# Patient Record
Sex: FEMALE
[De-identification: names, ages, dates, MRNs, and addresses within clinical notes are randomized per-mention and may not be internally consistent; named-entity substitution may affect disease eponyms.]

---

## 2017-01-04 ENCOUNTER — HOSPITAL ENCOUNTER (OUTPATIENT)
Dept: HOSPITAL 5 - WOUND | Age: 82
Discharge: HOME | End: 2017-01-04
Attending: ORTHOPAEDIC SURGERY
Payer: MEDICARE

## 2017-01-04 DIAGNOSIS — I10: ICD-10-CM

## 2017-01-04 DIAGNOSIS — B95.62: ICD-10-CM

## 2017-01-04 DIAGNOSIS — J43.9: ICD-10-CM

## 2017-01-04 DIAGNOSIS — I25.2: ICD-10-CM

## 2017-01-04 DIAGNOSIS — L97.821: ICD-10-CM

## 2017-01-04 DIAGNOSIS — I70.248: Primary | ICD-10-CM

## 2017-01-18 ENCOUNTER — HOSPITAL ENCOUNTER (OUTPATIENT)
Dept: HOSPITAL 5 - WOUND | Age: 82
Discharge: HOME | End: 2017-01-18
Attending: ORTHOPAEDIC SURGERY
Payer: MEDICARE

## 2017-01-18 DIAGNOSIS — I25.2: ICD-10-CM

## 2017-01-18 DIAGNOSIS — I10: ICD-10-CM

## 2017-01-18 DIAGNOSIS — B35.1: ICD-10-CM

## 2017-01-18 DIAGNOSIS — L97.821: ICD-10-CM

## 2017-01-18 DIAGNOSIS — J43.9: ICD-10-CM

## 2017-01-18 DIAGNOSIS — I70.248: Primary | ICD-10-CM

## 2017-01-18 DIAGNOSIS — I73.9: ICD-10-CM

## 2017-02-22 ENCOUNTER — HOSPITAL ENCOUNTER (OUTPATIENT)
Dept: HOSPITAL 5 - WOUND | Age: 82
Discharge: HOME | End: 2017-02-22
Attending: INTERNAL MEDICINE
Payer: MEDICARE

## 2017-02-22 DIAGNOSIS — L89.622: ICD-10-CM

## 2017-02-22 DIAGNOSIS — I20.8: ICD-10-CM

## 2017-02-22 DIAGNOSIS — I10: ICD-10-CM

## 2017-02-22 DIAGNOSIS — L97.822: ICD-10-CM

## 2017-02-22 DIAGNOSIS — I70.248: Primary | ICD-10-CM

## 2017-02-22 DIAGNOSIS — I73.9: ICD-10-CM

## 2017-02-22 DIAGNOSIS — I25.2: ICD-10-CM

## 2017-02-22 DIAGNOSIS — J43.9: ICD-10-CM

## 2017-02-22 DIAGNOSIS — B35.1: ICD-10-CM

## 2017-02-22 DIAGNOSIS — K21.0: ICD-10-CM

## 2017-02-22 DIAGNOSIS — G62.9: ICD-10-CM

## 2017-03-01 ENCOUNTER — HOSPITAL ENCOUNTER (OUTPATIENT)
Dept: HOSPITAL 5 - WOUND | Age: 82
Discharge: HOME | End: 2017-03-01
Attending: INTERNAL MEDICINE
Payer: MEDICARE

## 2017-03-01 DIAGNOSIS — I70.238: ICD-10-CM

## 2017-03-01 DIAGNOSIS — L89.622: ICD-10-CM

## 2017-03-01 DIAGNOSIS — J43.9: ICD-10-CM

## 2017-03-01 DIAGNOSIS — L97.822: ICD-10-CM

## 2017-03-01 DIAGNOSIS — B35.1: ICD-10-CM

## 2017-03-01 DIAGNOSIS — G62.9: ICD-10-CM

## 2017-03-01 DIAGNOSIS — I25.2: ICD-10-CM

## 2017-03-01 DIAGNOSIS — I70.248: ICD-10-CM

## 2017-03-01 DIAGNOSIS — I87.312: Primary | ICD-10-CM

## 2017-03-01 DIAGNOSIS — K21.0: ICD-10-CM

## 2017-03-01 DIAGNOSIS — I73.9: ICD-10-CM

## 2017-03-01 DIAGNOSIS — L97.812: ICD-10-CM

## 2017-03-01 PROCEDURE — 11055 PARING/CUTG B9 HYPRKER LES 1: CPT

## 2017-03-15 ENCOUNTER — HOSPITAL ENCOUNTER (OUTPATIENT)
Dept: HOSPITAL 5 - WOUND | Age: 82
Discharge: HOME | End: 2017-03-15
Attending: INTERNAL MEDICINE
Payer: MEDICARE

## 2017-03-15 DIAGNOSIS — I70.203: ICD-10-CM

## 2017-03-15 DIAGNOSIS — K21.0: ICD-10-CM

## 2017-03-15 DIAGNOSIS — I20.8: ICD-10-CM

## 2017-03-15 DIAGNOSIS — I87.312: Primary | ICD-10-CM

## 2017-03-15 DIAGNOSIS — I73.9: ICD-10-CM

## 2017-03-15 DIAGNOSIS — I25.2: ICD-10-CM

## 2017-03-15 DIAGNOSIS — J43.9: ICD-10-CM

## 2017-03-15 DIAGNOSIS — L97.822: ICD-10-CM

## 2017-03-15 DIAGNOSIS — B35.1: ICD-10-CM

## 2017-03-15 DIAGNOSIS — G62.9: ICD-10-CM

## 2017-03-29 ENCOUNTER — HOSPITAL ENCOUNTER (OUTPATIENT)
Dept: HOSPITAL 5 - WOUND | Age: 82
Discharge: HOME | End: 2017-03-29
Attending: PODIATRIST
Payer: MEDICARE

## 2017-03-29 DIAGNOSIS — I20.8: ICD-10-CM

## 2017-03-29 DIAGNOSIS — K21.0: ICD-10-CM

## 2017-03-29 DIAGNOSIS — I10: ICD-10-CM

## 2017-03-29 DIAGNOSIS — J43.9: ICD-10-CM

## 2017-03-29 DIAGNOSIS — I73.9: ICD-10-CM

## 2017-03-29 DIAGNOSIS — I70.203: ICD-10-CM

## 2017-03-29 DIAGNOSIS — L97.821: ICD-10-CM

## 2017-03-29 DIAGNOSIS — I87.312: Primary | ICD-10-CM

## 2017-03-29 DIAGNOSIS — L89.622: ICD-10-CM

## 2017-03-29 DIAGNOSIS — I25.2: ICD-10-CM

## 2017-03-29 PROCEDURE — 11042 DBRDMT SUBQ TIS 1ST 20SQCM/<: CPT

## 2017-04-12 ENCOUNTER — HOSPITAL ENCOUNTER (OUTPATIENT)
Dept: HOSPITAL 5 - WOUND | Age: 82
Discharge: HOME | End: 2017-04-12
Attending: INTERNAL MEDICINE
Payer: MEDICARE

## 2017-04-12 DIAGNOSIS — I73.9: ICD-10-CM

## 2017-04-12 DIAGNOSIS — G62.9: ICD-10-CM

## 2017-04-12 DIAGNOSIS — I87.312: Primary | ICD-10-CM

## 2017-04-12 DIAGNOSIS — I28.0: ICD-10-CM

## 2017-04-12 DIAGNOSIS — K21.0: ICD-10-CM

## 2017-04-12 DIAGNOSIS — J43.9: ICD-10-CM

## 2017-04-12 DIAGNOSIS — I70.203: ICD-10-CM

## 2017-04-12 DIAGNOSIS — L97.822: ICD-10-CM

## 2017-04-12 DIAGNOSIS — I25.2: ICD-10-CM

## 2017-04-12 PROCEDURE — C5271 LOW COST SKIN SUBSTITUTE APP: HCPCS

## 2017-04-19 ENCOUNTER — HOSPITAL ENCOUNTER (OUTPATIENT)
Dept: HOSPITAL 5 - WOUND | Age: 82
Discharge: HOME | End: 2017-04-19
Attending: INTERNAL MEDICINE
Payer: MEDICARE

## 2017-04-19 DIAGNOSIS — I87.312: Primary | ICD-10-CM

## 2017-04-19 DIAGNOSIS — L89.622: ICD-10-CM

## 2017-04-19 DIAGNOSIS — I73.9: ICD-10-CM

## 2017-04-19 DIAGNOSIS — L97.821: ICD-10-CM

## 2017-04-19 DIAGNOSIS — K21.0: ICD-10-CM

## 2017-04-19 DIAGNOSIS — I70.203: ICD-10-CM

## 2017-04-19 DIAGNOSIS — J43.9: ICD-10-CM

## 2017-04-19 DIAGNOSIS — I20.8: ICD-10-CM

## 2017-04-19 DIAGNOSIS — I25.2: ICD-10-CM

## 2017-04-26 ENCOUNTER — HOSPITAL ENCOUNTER (OUTPATIENT)
Dept: HOSPITAL 5 - WOUND | Age: 82
Discharge: HOME | End: 2017-04-26
Attending: SURGERY
Payer: MEDICARE

## 2017-04-26 DIAGNOSIS — I70.203: ICD-10-CM

## 2017-04-26 DIAGNOSIS — I87.312: ICD-10-CM

## 2017-04-26 DIAGNOSIS — K21.9: ICD-10-CM

## 2017-04-26 DIAGNOSIS — I73.9: ICD-10-CM

## 2017-04-26 DIAGNOSIS — G62.9: ICD-10-CM

## 2017-04-26 DIAGNOSIS — I87.2: Primary | ICD-10-CM

## 2017-04-26 DIAGNOSIS — I25.2: ICD-10-CM

## 2017-04-26 DIAGNOSIS — L89.622: ICD-10-CM

## 2017-04-26 DIAGNOSIS — L97.822: ICD-10-CM

## 2017-04-26 DIAGNOSIS — J43.9: ICD-10-CM

## 2017-04-26 DIAGNOSIS — L89.892: ICD-10-CM

## 2017-04-26 PROCEDURE — 97597 DBRDMT OPN WND 1ST 20 CM/<: CPT

## 2017-05-03 ENCOUNTER — HOSPITAL ENCOUNTER (OUTPATIENT)
Dept: HOSPITAL 5 - WOUND | Age: 82
Discharge: HOME | End: 2017-05-03
Attending: SURGERY
Payer: MEDICARE

## 2017-05-03 DIAGNOSIS — G62.9: ICD-10-CM

## 2017-05-03 DIAGNOSIS — I70.203: ICD-10-CM

## 2017-05-03 DIAGNOSIS — I87.2: ICD-10-CM

## 2017-05-03 DIAGNOSIS — I73.9: ICD-10-CM

## 2017-05-03 DIAGNOSIS — L97.822: ICD-10-CM

## 2017-05-03 DIAGNOSIS — K21.9: ICD-10-CM

## 2017-05-03 DIAGNOSIS — I87.312: ICD-10-CM

## 2017-05-03 DIAGNOSIS — L89.622: Primary | ICD-10-CM

## 2017-05-03 DIAGNOSIS — J43.9: ICD-10-CM

## 2017-05-03 DIAGNOSIS — I25.2: ICD-10-CM

## 2017-05-03 PROCEDURE — 97597 DBRDMT OPN WND 1ST 20 CM/<: CPT

## 2017-05-10 ENCOUNTER — HOSPITAL ENCOUNTER (OUTPATIENT)
Dept: HOSPITAL 5 - WOUND | Age: 82
Discharge: HOME | End: 2017-05-10
Attending: INTERNAL MEDICINE
Payer: MEDICARE

## 2017-05-10 DIAGNOSIS — I70.203: ICD-10-CM

## 2017-05-10 DIAGNOSIS — I87.312: Primary | ICD-10-CM

## 2017-05-10 DIAGNOSIS — J43.9: ICD-10-CM

## 2017-05-10 DIAGNOSIS — I25.2: ICD-10-CM

## 2017-05-10 DIAGNOSIS — G62.9: ICD-10-CM

## 2017-05-10 DIAGNOSIS — I73.9: ICD-10-CM

## 2017-05-10 DIAGNOSIS — L97.822: ICD-10-CM

## 2017-05-10 DIAGNOSIS — K21.0: ICD-10-CM

## 2017-05-10 DIAGNOSIS — I20.8: ICD-10-CM

## 2017-05-17 ENCOUNTER — HOSPITAL ENCOUNTER (OUTPATIENT)
Dept: HOSPITAL 5 - WOUND | Age: 82
Discharge: HOME | End: 2017-05-17
Attending: SURGERY
Payer: MEDICARE

## 2017-05-17 DIAGNOSIS — L97.822: ICD-10-CM

## 2017-05-17 DIAGNOSIS — I73.9: ICD-10-CM

## 2017-05-17 DIAGNOSIS — G62.9: ICD-10-CM

## 2017-05-17 DIAGNOSIS — I87.312: Primary | ICD-10-CM

## 2017-05-17 DIAGNOSIS — I70.203: ICD-10-CM

## 2017-05-17 DIAGNOSIS — J43.9: ICD-10-CM

## 2017-05-17 DIAGNOSIS — K21.9: ICD-10-CM

## 2017-05-17 DIAGNOSIS — I25.2: ICD-10-CM

## 2017-05-24 ENCOUNTER — HOSPITAL ENCOUNTER (OUTPATIENT)
Dept: HOSPITAL 5 - WOUND | Age: 82
Discharge: HOME | End: 2017-05-24
Attending: SURGERY
Payer: MEDICARE

## 2017-05-24 DIAGNOSIS — I25.2: ICD-10-CM

## 2017-05-24 DIAGNOSIS — I87.312: Primary | ICD-10-CM

## 2017-05-24 DIAGNOSIS — L97.822: ICD-10-CM

## 2017-05-24 DIAGNOSIS — J43.9: ICD-10-CM

## 2017-05-24 DIAGNOSIS — I70.203: ICD-10-CM

## 2017-05-24 DIAGNOSIS — K21.9: ICD-10-CM

## 2017-05-24 DIAGNOSIS — G62.9: ICD-10-CM

## 2017-05-24 DIAGNOSIS — I73.9: ICD-10-CM

## 2017-05-31 ENCOUNTER — HOSPITAL ENCOUNTER (OUTPATIENT)
Dept: HOSPITAL 5 - WOUND | Age: 82
Discharge: HOME | End: 2017-05-31
Attending: SURGERY
Payer: MEDICARE

## 2017-05-31 DIAGNOSIS — L97.822: ICD-10-CM

## 2017-05-31 DIAGNOSIS — I10: ICD-10-CM

## 2017-05-31 DIAGNOSIS — G62.9: ICD-10-CM

## 2017-05-31 DIAGNOSIS — I73.9: ICD-10-CM

## 2017-05-31 DIAGNOSIS — I25.2: ICD-10-CM

## 2017-05-31 DIAGNOSIS — J43.9: ICD-10-CM

## 2017-05-31 DIAGNOSIS — K21.9: ICD-10-CM

## 2017-05-31 DIAGNOSIS — I70.203: ICD-10-CM

## 2017-05-31 DIAGNOSIS — I87.312: Primary | ICD-10-CM

## 2017-05-31 PROCEDURE — 29580 STRAPPING UNNA BOOT: CPT

## 2017-06-07 ENCOUNTER — HOSPITAL ENCOUNTER (OUTPATIENT)
Dept: HOSPITAL 5 - WOUND | Age: 82
Discharge: HOME | End: 2017-06-07
Attending: SURGERY
Payer: MEDICARE

## 2017-06-07 DIAGNOSIS — I70.203: ICD-10-CM

## 2017-06-07 DIAGNOSIS — I87.312: Primary | ICD-10-CM

## 2017-06-07 DIAGNOSIS — I73.9: ICD-10-CM

## 2017-06-07 DIAGNOSIS — K21.9: ICD-10-CM

## 2017-06-07 DIAGNOSIS — G62.9: ICD-10-CM

## 2017-06-07 DIAGNOSIS — I25.2: ICD-10-CM

## 2017-06-07 DIAGNOSIS — J43.9: ICD-10-CM

## 2017-06-07 DIAGNOSIS — L97.822: ICD-10-CM

## 2017-06-21 ENCOUNTER — HOSPITAL ENCOUNTER (OUTPATIENT)
Dept: HOSPITAL 5 - WOUND | Age: 82
Discharge: HOME | End: 2017-06-21
Attending: SURGERY
Payer: MEDICARE

## 2017-06-21 DIAGNOSIS — L97.822: ICD-10-CM

## 2017-06-21 DIAGNOSIS — K21.9: ICD-10-CM

## 2017-06-21 DIAGNOSIS — I70.203: ICD-10-CM

## 2017-06-21 DIAGNOSIS — J43.9: ICD-10-CM

## 2017-06-21 DIAGNOSIS — I87.312: Primary | ICD-10-CM

## 2017-06-21 DIAGNOSIS — E11.40: ICD-10-CM

## 2017-06-21 DIAGNOSIS — I25.2: ICD-10-CM

## 2017-06-21 DIAGNOSIS — E11.51: ICD-10-CM

## 2017-06-28 ENCOUNTER — HOSPITAL ENCOUNTER (OUTPATIENT)
Dept: HOSPITAL 5 - WOUND | Age: 82
Discharge: HOME | End: 2017-06-28
Attending: SURGERY
Payer: MEDICARE

## 2017-06-28 DIAGNOSIS — I70.203: ICD-10-CM

## 2017-06-28 DIAGNOSIS — G62.9: ICD-10-CM

## 2017-06-28 DIAGNOSIS — K21.9: ICD-10-CM

## 2017-06-28 DIAGNOSIS — J43.9: ICD-10-CM

## 2017-06-28 DIAGNOSIS — I25.2: ICD-10-CM

## 2017-06-28 DIAGNOSIS — I73.9: ICD-10-CM

## 2017-06-28 DIAGNOSIS — Z90.710: ICD-10-CM

## 2017-06-28 DIAGNOSIS — L97.822: ICD-10-CM

## 2017-06-28 DIAGNOSIS — I87.312: Primary | ICD-10-CM

## 2017-07-05 ENCOUNTER — HOSPITAL ENCOUNTER (OUTPATIENT)
Dept: HOSPITAL 5 - WOUND | Age: 82
Discharge: HOME | End: 2017-07-05
Attending: SURGERY
Payer: MEDICARE

## 2017-07-05 DIAGNOSIS — I87.312: Primary | ICD-10-CM

## 2017-07-05 DIAGNOSIS — I70.203: ICD-10-CM

## 2017-07-05 DIAGNOSIS — G62.9: ICD-10-CM

## 2017-07-05 DIAGNOSIS — L97.822: ICD-10-CM

## 2017-07-05 DIAGNOSIS — Z90.710: ICD-10-CM

## 2017-07-05 DIAGNOSIS — K21.9: ICD-10-CM

## 2017-07-05 DIAGNOSIS — J43.9: ICD-10-CM

## 2017-07-05 DIAGNOSIS — I25.2: ICD-10-CM

## 2017-07-05 DIAGNOSIS — I73.9: ICD-10-CM

## 2017-07-12 ENCOUNTER — HOSPITAL ENCOUNTER (OUTPATIENT)
Dept: HOSPITAL 5 - WOUND | Age: 82
Discharge: HOME | End: 2017-07-12
Attending: SURGERY
Payer: MEDICARE

## 2017-07-12 DIAGNOSIS — Z90.710: ICD-10-CM

## 2017-07-12 DIAGNOSIS — J43.9: ICD-10-CM

## 2017-07-12 DIAGNOSIS — K21.9: ICD-10-CM

## 2017-07-12 DIAGNOSIS — L97.521: ICD-10-CM

## 2017-07-12 DIAGNOSIS — I25.2: ICD-10-CM

## 2017-07-12 DIAGNOSIS — I10: ICD-10-CM

## 2017-07-12 DIAGNOSIS — I73.9: ICD-10-CM

## 2017-07-12 DIAGNOSIS — L97.822: ICD-10-CM

## 2017-07-12 DIAGNOSIS — I87.312: Primary | ICD-10-CM

## 2017-07-12 DIAGNOSIS — G62.9: ICD-10-CM

## 2017-07-19 ENCOUNTER — HOSPITAL ENCOUNTER (OUTPATIENT)
Dept: HOSPITAL 5 - WOUND | Age: 82
Discharge: HOME | End: 2017-07-19
Attending: SURGERY
Payer: MEDICARE

## 2017-07-19 DIAGNOSIS — L97.822: ICD-10-CM

## 2017-07-19 DIAGNOSIS — Z90.710: ICD-10-CM

## 2017-07-19 DIAGNOSIS — J43.9: ICD-10-CM

## 2017-07-19 DIAGNOSIS — I73.9: ICD-10-CM

## 2017-07-19 DIAGNOSIS — G62.9: ICD-10-CM

## 2017-07-19 DIAGNOSIS — I87.312: Primary | ICD-10-CM

## 2017-07-19 DIAGNOSIS — K21.9: ICD-10-CM

## 2017-07-26 ENCOUNTER — HOSPITAL ENCOUNTER (OUTPATIENT)
Dept: HOSPITAL 5 - WOUND | Age: 82
Discharge: HOME | End: 2017-07-26
Attending: SURGERY
Payer: MEDICARE

## 2017-07-26 DIAGNOSIS — J43.9: ICD-10-CM

## 2017-07-26 DIAGNOSIS — Z90.710: ICD-10-CM

## 2017-07-26 DIAGNOSIS — I87.312: Primary | ICD-10-CM

## 2017-07-26 DIAGNOSIS — I25.2: ICD-10-CM

## 2017-07-26 DIAGNOSIS — K21.9: ICD-10-CM

## 2017-07-26 DIAGNOSIS — I10: ICD-10-CM

## 2017-07-26 DIAGNOSIS — I70.203: ICD-10-CM

## 2017-07-26 DIAGNOSIS — I73.9: ICD-10-CM

## 2017-07-26 DIAGNOSIS — G62.9: ICD-10-CM

## 2017-07-26 DIAGNOSIS — L97.822: ICD-10-CM

## 2017-08-02 ENCOUNTER — HOSPITAL ENCOUNTER (OUTPATIENT)
Dept: HOSPITAL 5 - WOUND | Age: 82
Discharge: HOME | End: 2017-08-02
Attending: SURGERY
Payer: MEDICARE

## 2017-08-02 DIAGNOSIS — I73.9: ICD-10-CM

## 2017-08-02 DIAGNOSIS — K21.9: ICD-10-CM

## 2017-08-02 DIAGNOSIS — J43.9: ICD-10-CM

## 2017-08-02 DIAGNOSIS — Z90.710: ICD-10-CM

## 2017-08-02 DIAGNOSIS — G62.9: ICD-10-CM

## 2017-08-02 DIAGNOSIS — I25.2: ICD-10-CM

## 2017-08-02 DIAGNOSIS — L97.521: ICD-10-CM

## 2017-08-02 DIAGNOSIS — L97.822: ICD-10-CM

## 2017-08-02 DIAGNOSIS — I87.312: Primary | ICD-10-CM

## 2017-08-16 ENCOUNTER — HOSPITAL ENCOUNTER (OUTPATIENT)
Dept: HOSPITAL 5 - WOUND | Age: 82
Discharge: HOME | End: 2017-08-16
Attending: SURGERY
Payer: MEDICARE

## 2017-08-16 DIAGNOSIS — Z90.710: ICD-10-CM

## 2017-08-16 DIAGNOSIS — I25.2: ICD-10-CM

## 2017-08-16 DIAGNOSIS — J43.9: ICD-10-CM

## 2017-08-16 DIAGNOSIS — G62.9: ICD-10-CM

## 2017-08-16 DIAGNOSIS — I87.312: Primary | ICD-10-CM

## 2017-08-16 DIAGNOSIS — K21.9: ICD-10-CM

## 2017-08-16 DIAGNOSIS — I73.9: ICD-10-CM

## 2017-08-16 DIAGNOSIS — I70.203: ICD-10-CM

## 2017-08-16 DIAGNOSIS — L97.822: ICD-10-CM

## 2017-08-16 PROCEDURE — 99213 OFFICE O/P EST LOW 20 MIN: CPT

## 2017-08-16 PROCEDURE — G0463 HOSPITAL OUTPT CLINIC VISIT: HCPCS

## 2019-12-16 ENCOUNTER — HOSPITAL ENCOUNTER (OUTPATIENT)
Dept: HOSPITAL 5 - SPVIMAG | Age: 84
Discharge: HOME | End: 2019-12-16
Attending: INTERNAL MEDICINE
Payer: MEDICARE
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
## 2019-12-16 DIAGNOSIS — E88.89: ICD-10-CM

## 2019-12-16 DIAGNOSIS — M19.012: ICD-10-CM

## 2019-12-16 DIAGNOSIS — I70.0: ICD-10-CM

## 2019-12-16 DIAGNOSIS — M85.88: ICD-10-CM

## 2019-12-16 DIAGNOSIS — M47.814: Primary | ICD-10-CM

## 2019-12-16 DIAGNOSIS — M17.0: ICD-10-CM

## 2019-12-16 PROCEDURE — 72040 X-RAY EXAM NECK SPINE 2-3 VW: CPT

## 2019-12-16 PROCEDURE — 73521 X-RAY EXAM HIPS BI 2 VIEWS: CPT

## 2019-12-16 PROCEDURE — 72170 X-RAY EXAM OF PELVIS: CPT

## 2019-12-16 PROCEDURE — 72100 X-RAY EXAM L-S SPINE 2/3 VWS: CPT

## 2019-12-16 PROCEDURE — 72072 X-RAY EXAM THORAC SPINE 3VWS: CPT

## 2019-12-16 NOTE — XRAY REPORT
PELVIS ONE VIEW



INDICATION / CLINICAL INFORMATION:

DECREASED MOBILITY



COMPARISON:

None available.

 

FINDINGS:



BONES / JOINT(S): No acute fracture or subluxation. There is degenerative change in the hip joints.

SOFT TISSUES: No significant abnormality.



ADDITIONAL FINDINGS: There is subjective osteopenia. There is some dystrophic calcification or ossifi
cation in the soft tissue of the left buttock







Signer Name: Uriel Buchanan MD 

Signed: 12/16/2019 5:11 PM

 Workstation Name: [id number]

## 2019-12-16 NOTE — XRAY REPORT
LEFT SHOULDER 3 VIEWS



INDICATION / CLINICAL INFORMATION:

DECREASED MOBILITY



COMPARISON:

None available.

 

FINDINGS:



BONES / JOINT(S): No acute fracture or subluxation. There is mild degenerative change in the glenohum
eral joint.

SOFT TISSUES: No significant abnormality.



ADDITIONAL FINDINGS: There is subjective osteopenia.







Signer Name: Uriel Buchanan MD 

Signed: 12/16/2019 5:09 PM

 [facility] Name: [id number]

## 2019-12-16 NOTE — XRAY REPORT
LUMBAR SPINE 3 VIEWS



INDICATION:

DECREASED MOBILITY



COMPARISON:

None.



FINDINGS:

There are superior endplate compression fractures of L1, L4, and L5. There is an approximate 25% loss
 of height of L1 an approximate 50% height of L4 and 50-75% loss of height of L5. These are age indet
erminate. 



Atherosclerotic calcifications are noted in the aorta and common iliac arteries.



There is subjective osteopenia.



Signer Name: Uriel Buchanan MD 

Signed: 12/16/2019 5:11 PM

 Workstation Name: [id number]

## 2019-12-16 NOTE — XRAY REPORT
Thoracic spine 3 views



Indication:

DECREASED MOBILITY



Findings:

There is superior plate compression and a midthoracic vertebra. This is age indeterminate.



There is mild spondylitic change in the mid and upper thoracic spine



Signer Name: Uriel Buchanan MD 

Signed: 12/16/2019 5:02 PM

 Workstation Name: [id number]

## 2019-12-16 NOTE — XRAY REPORT
BILATERAL HIP RADIOGRAPHS 4 VIEWS



INDICATION / CLINICAL INFORMATION:

DECREASED MOBILITY



COMPARISON:

None available.

 

FINDINGS:



BONES / JOINT(S): No acute fracture or subluxation. There is mild degenerative change in the hip join
ts.

SOFT TISSUES: No significant abnormality.



ADDITIONAL FINDINGS: There is subjective osteopenia.







Signer Name: Uriel Buchanan MD 

Signed: 12/16/2019 5:08 PM

 Workstation Name: [id number]

## 2019-12-16 NOTE — XRAY REPORT
Cervical spine 5 views



Indication:

DECREASED MOBILITY



Findings:

There is kyphosis of the upper thoracic/lower cervical spine. No subluxation is seen. Prevertebral so
ft tissues are unremarkable. No fracture is seen.







Signer Name: Uriel Buchanan MD 

Signed: 12/16/2019 5:13 PM

 Workstation Name: [id number]